# Patient Record
Sex: MALE | Race: WHITE | NOT HISPANIC OR LATINO | URBAN - METROPOLITAN AREA
[De-identification: names, ages, dates, MRNs, and addresses within clinical notes are randomized per-mention and may not be internally consistent; named-entity substitution may affect disease eponyms.]

---

## 2025-06-04 ENCOUNTER — OFFICE VISIT (OUTPATIENT)
Dept: URGENT CARE | Age: 35
End: 2025-06-04
Payer: COMMERCIAL

## 2025-06-04 VITALS
WEIGHT: 175 LBS | DIASTOLIC BLOOD PRESSURE: 72 MMHG | OXYGEN SATURATION: 99 % | HEIGHT: 71 IN | RESPIRATION RATE: 20 BRPM | SYSTOLIC BLOOD PRESSURE: 113 MMHG | BODY MASS INDEX: 24.5 KG/M2 | HEART RATE: 84 BPM | TEMPERATURE: 97.4 F

## 2025-06-04 DIAGNOSIS — J02.9 ACUTE SORE THROAT: Primary | ICD-10-CM

## 2025-06-04 DIAGNOSIS — B34.8 RHINOVIRUS: ICD-10-CM

## 2025-06-04 DIAGNOSIS — J02.9 VIRAL PHARYNGITIS: ICD-10-CM

## 2025-06-04 LAB
POC HUMAN RHINOVIRUS PCR: POSITIVE
POC INFLUENZA A VIRUS PCR: NEGATIVE
POC INFLUENZA B VIRUS PCR: NEGATIVE
POC RESPIRATORY SYNCYTIAL VIRUS PCR: NEGATIVE
POC STREPTOCOCCUS PYOGENES (GROUP A STREP) PCR: NEGATIVE

## 2025-06-04 ASSESSMENT — ENCOUNTER SYMPTOMS: SORE THROAT: 1

## 2025-06-04 NOTE — PROGRESS NOTES
"Subjective   Patient ID: Wade Wise is a 34 y.o. male. They present today with a chief complaint of Sore Throat (Sore throat x 1 day.).    History of Present Illness  Patient is a pleasant 34-year-old white male, no significant past medical history, presented to clinic for to complaint of sore throat.  Patient reporting 1 day history of sore throat with associated upper respiratory congestion rhinorrhea postnasal drainage.  Denies any cough or sputum production.  No fever or chills.  No chest pain or shortness of breath.  Denies any dysphagia odynophagia trismus drooling or change in voice.  States he did take some ibuprofen at home with minimal relief and therefore reported to clinic for further evaluation and assessment.  States he is eating and drinking without difficulty.        Sore Throat         Past Medical History  Allergies as of 06/04/2025 - Reviewed 06/04/2025   Allergen Reaction Noted    Shellfish containing products Unknown 06/04/2025       Prescriptions Prior to Admission[1]       Medical History[2]    Surgical History[3]     reports that he has never smoked. He has never been exposed to tobacco smoke. He has never used smokeless tobacco. He reports that he does not use drugs.    Review of Systems  Review of Systems   HENT:  Positive for sore throat.                                   Objective    Vitals:    06/04/25 1201   BP: 113/72   Pulse: 84   Resp: 20   Temp: 36.3 °C (97.4 °F)   TempSrc: Oral   SpO2: 99%   Weight: 79.4 kg (175 lb)   Height: 1.803 m (5' 11\")     No LMP for male patient.    Physical Exam  General: Vitals Noted. No distress. Normocephalic.     HEENT: TMs normal, EOMI, normal conjunctiva, patent nares with erythematous edematous and appear nasal turbinates and clear rhinorrhea bilaterally.  Posterior oropharynx with signs of postnasal drainage without any erythema swelling or tonsillar exudate.  Uvula is in the midline and nonedematous.  No drooling.  No trismus.    Neck: " Supple with no adenopathy.     Cardiac: Regular Rate and Rhythm. No murmur.     Pulmonary: Equal breath sounds bilaterally. No wheezes, rhonchi, or rales.    Abdomen: Soft, non-tender, with normal bowel sounds.     Musculoskeletal: Moves all extremities, no effusion, no edema.     Skin: No obvious rashes.  Procedures    Point of Care Test & Imaging Results from this visit    Imaging  No results found.    Cardiology, Vascular, and Other Imaging  No other imaging results found for the past 2 days      Diagnostic study results (if any) were reviewed by Rehan Little PA-C.    Assessment/Plan   Allergies, medications, history, and pertinent labs/EKGs/Imaging reviewed by Rehan Little PA-C.     Medical Decision Making  Patient was seen eval in the clinic for complaint sore throat with upper respiratory congestion rhinorrhea and postnasal drainage.  On exam patient is nontoxic very well-appearing respite comfortably no acute distress.  Vital signs are stable, afebrile.  Chest is clear, it is regular, belly soft and nontender.  ENT exam as above concerning for viral illness.  [employer] testing was performed and returned positive for rhinovirus.  Will advise supportive care measures at home including plenty of fluids, rest, Tylenol or Profen every 6 hours as needed.  Also advised warm salt water gargles.  Advised to follow-up this primary care physician in the next week.  I reviewed my impression, plan, strict return report to ED precautions with the patient.  He expresses understanding and agreement plan of care.    Orders and Diagnoses  Diagnoses and all orders for this visit:  Acute sore throat  -     POCT SPOTFIRE R/ST Panel Mini w/Strep A ([facility]) manually resulted  Viral pharyngitis  Rhinovirus        Medical Admin Record      Follow Up Instructions  No follow-ups on file.    Patient disposition: Home    Electronically signed by Rehan Little PA-C  12:32 PM         [1] (Not in a hospital  admission)  [2] No past medical history on file.  [3] No past surgical history on file.